# Patient Record
Sex: MALE | Race: WHITE | URBAN - METROPOLITAN AREA
[De-identification: names, ages, dates, MRNs, and addresses within clinical notes are randomized per-mention and may not be internally consistent; named-entity substitution may affect disease eponyms.]

---

## 2020-07-25 ENCOUNTER — TELEPHONE ENCOUNTER (OUTPATIENT)
Dept: URBAN - METROPOLITAN AREA CLINIC 13 | Facility: CLINIC | Age: 55
End: 2020-07-25

## 2020-07-26 ENCOUNTER — TELEPHONE ENCOUNTER (OUTPATIENT)
Dept: URBAN - METROPOLITAN AREA CLINIC 13 | Facility: CLINIC | Age: 55
End: 2020-07-26

## 2021-03-25 ENCOUNTER — WEB ENCOUNTER (OUTPATIENT)
Dept: URBAN - METROPOLITAN AREA CLINIC 113 | Facility: CLINIC | Age: 56
End: 2021-03-25

## 2021-03-25 ENCOUNTER — OFFICE VISIT (OUTPATIENT)
Dept: URBAN - METROPOLITAN AREA CLINIC 113 | Facility: CLINIC | Age: 56
End: 2021-03-25
Payer: SELF-PAY

## 2021-03-25 VITALS
WEIGHT: 180 LBS | HEART RATE: 89 BPM | DIASTOLIC BLOOD PRESSURE: 80 MMHG | BODY MASS INDEX: 25.77 KG/M2 | SYSTOLIC BLOOD PRESSURE: 128 MMHG | TEMPERATURE: 99.5 F | HEIGHT: 70 IN | RESPIRATION RATE: 20 BRPM

## 2021-03-25 DIAGNOSIS — L98.9 DISORDER OF PERIANAL SKIN: ICD-10-CM

## 2021-03-25 PROCEDURE — 99203 OFFICE O/P NEW LOW 30 MIN: CPT | Performed by: INTERNAL MEDICINE

## 2021-03-25 RX ORDER — DEXTROAMPHETAMINE SACCHARATE, AMPHETAMINE ASPARTATE, DEXTROAMPHETAMINE SULFATE, AND AMPHETAMINE SULFATE 5; 5; 5; 5 MG/1; MG/1; MG/1; MG/1
1 TABLET TABLET ORAL THREE TIMES A DAY
Status: ACTIVE | COMMUNITY

## 2021-03-25 NOTE — PHYSICAL EXAM RECTAL:
perianal examination reveals a abnormal skin lesions essentially circumferential around the anus.  This does not involve the anus.  There is no bleeding.  This appears almost corrugated and viral.

## 2021-03-25 NOTE — HPI-TODAY'S VISIT:
patient presents today for evaluation of abnormal skin around the rectum.  He reports that this was found with his examination with his primary care physician.  He denies any GI complaints.  No abdominal pain, constipation or diarrhea.  No blood in the stool.  This area does not hurt.  He reports a little hard to clean up.  He uses flushilbe wipes.  his brother had colon cancer in his 50s.  His last colonoscopy was greater than 5 years ago.

## 2021-06-29 ENCOUNTER — OFFICE VISIT (OUTPATIENT)
Dept: URBAN - METROPOLITAN AREA CLINIC 113 | Facility: CLINIC | Age: 56
End: 2021-06-29

## 2021-06-29 RX ORDER — DEXTROAMPHETAMINE SACCHARATE, AMPHETAMINE ASPARTATE, DEXTROAMPHETAMINE SULFATE, AND AMPHETAMINE SULFATE 5; 5; 5; 5 MG/1; MG/1; MG/1; MG/1
1 TABLET TABLET ORAL THREE TIMES A DAY
Status: ACTIVE | COMMUNITY

## 2022-09-22 ENCOUNTER — OFFICE VISIT (OUTPATIENT)
Dept: URBAN - METROPOLITAN AREA CLINIC 107 | Facility: CLINIC | Age: 57
End: 2022-09-22
Payer: SELF-PAY

## 2022-09-22 ENCOUNTER — WEB ENCOUNTER (OUTPATIENT)
Dept: URBAN - METROPOLITAN AREA CLINIC 107 | Facility: CLINIC | Age: 57
End: 2022-09-22

## 2022-09-22 ENCOUNTER — DASHBOARD ENCOUNTERS (OUTPATIENT)
Age: 57
End: 2022-09-22

## 2022-09-22 VITALS
DIASTOLIC BLOOD PRESSURE: 90 MMHG | TEMPERATURE: 97.5 F | BODY MASS INDEX: 25.4 KG/M2 | HEART RATE: 85 BPM | SYSTOLIC BLOOD PRESSURE: 135 MMHG | WEIGHT: 177.4 LBS | HEIGHT: 70 IN

## 2022-09-22 DIAGNOSIS — Z80.0 FAMILY HISTORY OF COLON CANCER: ICD-10-CM

## 2022-09-22 DIAGNOSIS — K62.9 ANAL LESION: ICD-10-CM

## 2022-09-22 DIAGNOSIS — Z12.11 COLON CANCER SCREENING: ICD-10-CM

## 2022-09-22 DIAGNOSIS — L98.9 DISORDER OF PERIANAL SKIN: ICD-10-CM

## 2022-09-22 PROCEDURE — 99244 OFF/OP CNSLTJ NEW/EST MOD 40: CPT

## 2022-09-22 RX ORDER — DEXTROAMPHETAMINE SACCHARATE, AMPHETAMINE ASPARTATE, DEXTROAMPHETAMINE SULFATE, AND AMPHETAMINE SULFATE 5; 5; 5; 5 MG/1; MG/1; MG/1; MG/1
1 TABLET TABLET ORAL THREE TIMES A DAY
Status: ACTIVE | COMMUNITY

## 2022-09-22 NOTE — HPI-TODAY'S VISIT:
56-year-old male with a history of sleep apnea on CPAP, ADHD, ITP presents for long interval follow-up and evaluation of rectal lesion.    He was last seen on 3/25/2021 for evaluation of abnormal skin around the rectum.  He was without GI complaints at that time with exception of mild difficulty with anal hygiene.  His brother had colon cancer in his 50s.  He did have a colonoscopy about 5 years prior.  Rectal exam revealed abnormal skin lesion essentially circumferential around the anus.  This did not involve the anus and there was no bleeding.  Lesions appear to be corrugated and viral.  Suspected viral/wart related.  He was referred to dermatology (Dr. Marcus Stearns) for definitive biopsy and treatment recommendations.  Patient has been referred back by Dr. Peralta for colon cancer screening and evaluation of rectal lesions.  A copy of today's visit will be forwarded to the referring provider.  He had Covid in May and was ill for four months. He has not returned to his normal state of health. He remains very fatigued thoughout the day. He takes two adderall 20 mg tablets every morning without much effect. This has affected his quality of life and his job. He is works for himself in construction. He has worked approximately 30 days total over the last several months. He states his rectal lesion has "quadrupled in size" following his Covid infection. The lesion bothers him during the day and when wiping, especially over the last 2-3 weeks. The lesion has bled a few times simply from wiping or trauma. Bowel movements are regular. He denies abdominal pain. He did lose 15 pounds in three days due to Covid. The weight is now stable. His appetite is normal. He did see dermatology last year regarding the lesion. They did not biopsy the lesion however they suspected it was a wart. The wart was not treated, and he was told to return should the lesion grow in size or become bothersome. He has never received anal intercourse. He does not have a know history of HPV. He admits the lesions have begun to spread to his scrotum and penis shaft. His fiance is starting her third round of chemo for uterine cancer. THe cancer has metastasized. He has had a very difficult time coping this along with his declining health. He became tearful when discussing this.

## 2022-09-22 NOTE — PHYSICAL EXAM RECTAL:
Multiple, large, pink, confluent groups of corrugated papules circumferential around anus and involving anus orifice, no pain to palpation of lesions, No tenderness to KALIE, no blood or melena

## 2022-09-23 ENCOUNTER — TELEPHONE ENCOUNTER (OUTPATIENT)
Dept: URBAN - METROPOLITAN AREA CLINIC 113 | Facility: CLINIC | Age: 57
End: 2022-09-23

## 2022-09-27 ENCOUNTER — OFFICE VISIT (OUTPATIENT)
Dept: URBAN - METROPOLITAN AREA CLINIC 107 | Facility: CLINIC | Age: 57
End: 2022-09-27

## 2022-09-29 ENCOUNTER — OFFICE VISIT (OUTPATIENT)
Dept: URBAN - METROPOLITAN AREA CLINIC 107 | Facility: CLINIC | Age: 57
End: 2022-09-29

## 2022-10-06 ENCOUNTER — OFFICE VISIT (OUTPATIENT)
Dept: URBAN - METROPOLITAN AREA CLINIC 107 | Facility: CLINIC | Age: 57
End: 2022-10-06

## 2022-10-13 ENCOUNTER — OFFICE VISIT (OUTPATIENT)
Dept: URBAN - METROPOLITAN AREA CLINIC 107 | Facility: CLINIC | Age: 57
End: 2022-10-13

## 2022-10-20 ENCOUNTER — OFFICE VISIT (OUTPATIENT)
Dept: URBAN - METROPOLITAN AREA CLINIC 107 | Facility: CLINIC | Age: 57
End: 2022-10-20

## 2022-10-27 ENCOUNTER — OFFICE VISIT (OUTPATIENT)
Dept: URBAN - METROPOLITAN AREA CLINIC 107 | Facility: CLINIC | Age: 57
End: 2022-10-27

## 2022-11-03 ENCOUNTER — OFFICE VISIT (OUTPATIENT)
Dept: URBAN - METROPOLITAN AREA CLINIC 107 | Facility: CLINIC | Age: 57
End: 2022-11-03

## 2022-11-10 ENCOUNTER — OFFICE VISIT (OUTPATIENT)
Dept: URBAN - METROPOLITAN AREA CLINIC 107 | Facility: CLINIC | Age: 57
End: 2022-11-10

## 2022-11-17 ENCOUNTER — OFFICE VISIT (OUTPATIENT)
Dept: URBAN - METROPOLITAN AREA CLINIC 107 | Facility: CLINIC | Age: 57
End: 2022-11-17

## 2022-11-24 ENCOUNTER — OFFICE VISIT (OUTPATIENT)
Dept: URBAN - METROPOLITAN AREA CLINIC 107 | Facility: CLINIC | Age: 57
End: 2022-11-24

## 2022-11-30 ENCOUNTER — OFFICE VISIT (OUTPATIENT)
Dept: URBAN - METROPOLITAN AREA CLINIC 107 | Facility: CLINIC | Age: 57
End: 2022-11-30